# Patient Record
Sex: MALE | Race: BLACK OR AFRICAN AMERICAN | NOT HISPANIC OR LATINO | Employment: UNEMPLOYED | URBAN - METROPOLITAN AREA
[De-identification: names, ages, dates, MRNs, and addresses within clinical notes are randomized per-mention and may not be internally consistent; named-entity substitution may affect disease eponyms.]

---

## 2024-05-22 ENCOUNTER — HOSPITAL ENCOUNTER (EMERGENCY)
Facility: HOSPITAL | Age: 19
Discharge: HOME | End: 2024-05-22
Attending: EMERGENCY MEDICINE
Payer: COMMERCIAL

## 2024-05-22 ENCOUNTER — APPOINTMENT (OUTPATIENT)
Dept: CT IMAGING | Facility: HOSPITAL | Age: 19
End: 2024-05-22
Payer: COMMERCIAL

## 2024-05-22 VITALS
WEIGHT: 134 LBS | TEMPERATURE: 98.9 F | SYSTOLIC BLOOD PRESSURE: 126 MMHG | DIASTOLIC BLOOD PRESSURE: 83 MMHG | RESPIRATION RATE: 14 BRPM | BODY MASS INDEX: 19.18 KG/M2 | HEART RATE: 66 BPM | OXYGEN SATURATION: 97 % | HEIGHT: 70 IN

## 2024-05-22 DIAGNOSIS — G43.809 OTHER MIGRAINE WITHOUT STATUS MIGRAINOSUS, NOT INTRACTABLE: Primary | ICD-10-CM

## 2024-05-22 PROCEDURE — 99284 EMERGENCY DEPT VISIT MOD MDM: CPT

## 2024-05-22 PROCEDURE — 70450 CT HEAD/BRAIN W/O DYE: CPT

## 2024-05-22 NOTE — ED PROVIDER NOTES
EMERGENCY DEPARTMENT ENCOUNTER    Pt Name: Kelsea Locke  MRN: [id number]  Pt :   2005  Room Number:    Date of encounter:  2024  PCP: Provider, No Known  ED Provider: BANDAR Bell    Historian: Patient    HPI:  Chief Complaint: Headache    Context: Kelsea Locke is a 18 y.o. male who presents to the ED c/o headache.  Patient reports that he has been experiencing a severe stabbing headache behind his right eye.  He reports that he has been having this pain every day for approximately last 2-1/2 months.  He reports that initially when this pain started he was 15.  He shares that when he gets the pain he has to take medicine or go to sleep to help the pain to go away.  He shares that he usually takes ibuprofen that sometimes helps the pain.  Patient reports that he was scheduled to have an MRI completed in [place] but did not get this done before coming to [place].  He denies any fever.  Reports no nausea or vomiting.  He has had no dizziness or feeling lightheaded.  He denies any changes in his vision.  HPI     REVIEW OF SYSTEMS  A chief complaint appropriate review of systems was completed and is negative except as noted in the HPI.     PAST MEDICAL HISTORY  No past medical history on file.    PAST SURGICAL HISTORY  No past surgical history on file.    FAMILY HISTORY  No family history on file.    SOCIAL HISTORY  Social History     Socioeconomic History    Marital status: Single       ALLERGIES  Patient has no known allergies.    PHYSICAL EXAM  Physical Exam  GENERAL:   Appears in no acute distress.  HENT: Nares patent.  EYES: No scleral icterus.  CV: Regular rhythm, regular rate.  RESPIRATORY: Normal effort.  No audible wheezes, rales or rhonchi.  ABDOMEN: Soft, nontender  MUSCULOSKELETAL: No deformities.   NEURO: Alert, moves all extremities, follows commands.  No focal deficits.  SKIN: Warm, dry, no rash visualized.  I have reviewed the triage vital signs and nursing notes.     LAB  RESULTS  No results found for this or any previous visit.    If labs were ordered, I independently reviewed the results and considered them in treating the patient.    RADIOLOGY  CT Head Without Contrast   Final Result   Impression:   No acute intracranial abnormality.            Electronically Signed: Juan Alberto Rockwell MD     5/22/2024 9:07 PM EDT     Workstation ID: [id number]        [x] Radiologist's Report Reviewed:  I ordered and independently interpreted the above noted radiographic studies.  See radiologist's dictation for official interpretation.      PROCEDURES    Procedures    No orders to display       MEDICATIONS GIVEN IN ER    Medications - No data to display    MEDICAL DECISION MAKING, PROGRESS, and CONSULTS   Medical Decision Making  Problems Addressed:  Other migraine without status migrainosus, not intractable: complicated acute illness or injury    Amount and/or Complexity of Data Reviewed  Radiology: ordered.        All labs have been independently reviewed by me.  All radiology studies have been interpreted by me and the radiologist dictating the report.  All EKG's have been independently interpreted by me as well as and overseeing attending physician.    [] Discussed with radiology regarding test interpretation:    Discussion below represents my analysis of pertinent findings related to patient's condition, differential diagnosis, treatment plan and final disposition.    Differential diagnosis:  The differential diagnosis associated with the patient's presentation includes: Migraine, tension headache, sinusitis, viral syndrome, cluster headache or brain tumor.     Additional sources  Discussed/ obtained information from independent historians:   [] Spouse  [x] Parent  [] Family member  [] Friend  [] EMS   [] Other:  External (non-ED) record review:   [] Inpatient record:   [] Office record:   [] Outpatient record:   [] Prior Outpatient labs:   [] Prior Outpatient radiology:   [] Primary Care  record:   [] Outside ED record:   [] Other:   Patient's care impacted by:   [] Diabetes  [] Hypertension  [] Hyperlipidemia  [] Hypothyroidism   [] Coronary Artery Disease   [] COPD   [] Cancer   [] Obesity  [] GERD   [] Tobacco Abuse   [] Substance Abuse    [] Anxiety   [] Depression   [] Other:   Care significantly affected by Social Determinants of Health (housing and economic circumstances, unemployment)    [] Yes     [x] No   If yes, Patient's care significantly limited by  Social Determinants of Health including:   [] Inadequate housing   [] Low income   [] Alcoholism and drug addiction in family   [] Problems related to primary support group   [] Unemployment   [] Problems related to employment   [] Other Social Determinants of Health:     Shared decision making:  I had a discussion with the patient/family regarding diagnosis, diagnostic results, treatment plan, and medications.  The patient/family indicated understanding of these instructions.  I spent adequate time at the bedside preceding discharge necessary to personally discuss the aftercare instructions, giving patient education, providing explanations of the results of our evaluations/findings, and my decision making to assure that the patient/family understand the plan of care.  Time was allotted to answer questions at that time and throughout the ED course.  Emphasis was placed on timely follow-up after discharge.  I also discussed the potential for the development of an acute emergent condition requiring further evaluation, admission, or even surgical intervention. I discussed that we found nothing during the visit today indicating the need for further workup, admission, or the presence of an unstable medical condition.  I encouraged the patient to return to the emergency department immediately for ANY concerns, worsening, new complaints, or if symptoms persist and unable to seek follow-up in a timely fashion.  The patient/family expressed  understanding and agreement with this plan.  The patient will follow-up with primary care and neurology for reevaluation.      Orders placed during this visit:  Orders Placed This Encounter   Procedures    CT Head Without Contrast     ED Course:    ED Course as of 05/23/24 0248   Wed May 22, 2024   1955 Vitals and Telemetry tracing was reviewed and directly interpreted by myself demonstrating blood pressure 120/79, afebrile, heart rate of 68, respirations of 14 breaths/min and oxygen saturation 99% on room air [JG]   1956 BP: 120/79 [JG]   1956 Temp: 98.9 °F (37.2 °C) [JG]   1956 Heart Rate: 68 [JG]   1956 Resp: 14 [JG]   1956 SpO2: 99 % [JG]   2100 CT head personally interpreted by myself and with official read provided by radiology demonstrates  No acute intracranial abnormality. [JG]   2114 18-year-old nontoxic-appearing male presents ED for evaluation of reoccurring headache behind his right eye. Scheduled for imaging outpatient in [place] but has not followed up since moving to [place]. No acute or emergent findings demonstrated on physical exam. No focal neurologic deficits. CT head completed without evidence of acute findings. Based on clinical presentation and workup in ED no clear indication for treatment beyond symptomatic management. At time of discharge disposition patient is afebrile, nontoxic appearing, vital signs stable and able to maintain O2 sats of 97% on room air. Patient will be discharged home with symptomatic care and outpatient follow up. [JG]      ED Course User Index  [JG] Shamar Cordero PA          DIAGNOSIS  Final diagnoses:   Other migraine without status migrainosus, not intractable       DISPOSITION    ED Disposition       ED Disposition   Discharge    Condition   Stable    Comment   --               Please note that portions of this document were completed with voice recognition software.        Shamar Cordero PA  05/23/24 0248

## 2024-05-23 NOTE — DISCHARGE INSTRUCTIONS
Symptomatic care is recommended. Take all medications as prescribed and instructed. Follow up with primary care and Neurology as directed or return to Emergency Department with worsening of symptoms.

## 2024-05-24 ENCOUNTER — HOSPITAL ENCOUNTER (EMERGENCY)
Facility: HOSPITAL | Age: 19
Discharge: HOME | End: 2024-05-24
Attending: EMERGENCY MEDICINE
Payer: COMMERCIAL

## 2024-05-24 VITALS
WEIGHT: 135 LBS | HEIGHT: 70 IN | TEMPERATURE: 98 F | HEART RATE: 77 BPM | SYSTOLIC BLOOD PRESSURE: 143 MMHG | DIASTOLIC BLOOD PRESSURE: 93 MMHG | BODY MASS INDEX: 19.33 KG/M2 | RESPIRATION RATE: 18 BRPM | OXYGEN SATURATION: 99 %

## 2024-05-24 DIAGNOSIS — J30.2 SEASONAL ALLERGIES: ICD-10-CM

## 2024-05-24 DIAGNOSIS — G43.909 MIGRAINE WITHOUT STATUS MIGRAINOSUS, NOT INTRACTABLE, UNSPECIFIED MIGRAINE TYPE: Primary | ICD-10-CM

## 2024-05-24 LAB
ALBUMIN SERPL-MCNC: 4.7 G/DL (ref 3.5–5.2)
ALBUMIN/GLOB SERPL: 1.7 G/DL
ALP SERPL-CCNC: 86 U/L (ref 39–117)
ALT SERPL W P-5'-P-CCNC: 14 U/L (ref 1–41)
AMPHET+METHAMPHET UR QL: NEGATIVE
AMPHETAMINES UR QL: NEGATIVE
ANION GAP SERPL CALCULATED.3IONS-SCNC: 8 MMOL/L (ref 5–15)
AST SERPL-CCNC: 22 U/L (ref 1–40)
BARBITURATES UR QL SCN: NEGATIVE
BASOPHILS # BLD AUTO: 0.07 10*3/MM3 (ref 0–0.2)
BASOPHILS NFR BLD AUTO: 0.4 % (ref 0–1.5)
BENZODIAZ UR QL SCN: NEGATIVE
BILIRUB SERPL-MCNC: 0.4 MG/DL (ref 0–1.2)
BUN SERPL-MCNC: 13 MG/DL (ref 6–20)
BUN/CREAT SERPL: 14.1 (ref 7–25)
BUPRENORPHINE SERPL-MCNC: NEGATIVE NG/ML
CALCIUM SPEC-SCNC: 9.2 MG/DL (ref 8.6–10.5)
CANNABINOIDS SERPL QL: NEGATIVE
CHLORIDE SERPL-SCNC: 101 MMOL/L (ref 98–107)
CO2 SERPL-SCNC: 28 MMOL/L (ref 22–29)
COCAINE UR QL: NEGATIVE
CREAT SERPL-MCNC: 0.92 MG/DL (ref 0.76–1.27)
DEPRECATED RDW RBC AUTO: 41.9 FL (ref 37–54)
EGFRCR SERPLBLD CKD-EPI 2021: 122.1 ML/MIN/1.73
EOSINOPHIL # BLD AUTO: 0.06 10*3/MM3 (ref 0–0.4)
EOSINOPHIL NFR BLD AUTO: 0.3 % (ref 0.3–6.2)
ERYTHROCYTE [DISTWIDTH] IN BLOOD BY AUTOMATED COUNT: 12.3 % (ref 12.3–15.4)
ETHANOL BLD-MCNC: <10 MG/DL (ref 0–10)
FENTANYL UR-MCNC: NEGATIVE NG/ML
GLOBULIN UR ELPH-MCNC: 2.7 GM/DL
GLUCOSE SERPL-MCNC: 157 MG/DL (ref 65–99)
HCT VFR BLD AUTO: 42.5 % (ref 37.5–51)
HGB BLD-MCNC: 14.3 G/DL (ref 13–17.7)
IMM GRANULOCYTES # BLD AUTO: 0.07 10*3/MM3 (ref 0–0.05)
IMM GRANULOCYTES NFR BLD AUTO: 0.4 % (ref 0–0.5)
LYMPHOCYTES # BLD AUTO: 1.7 10*3/MM3 (ref 0.7–3.1)
LYMPHOCYTES NFR BLD AUTO: 9.8 % (ref 19.6–45.3)
MCH RBC QN AUTO: 31.1 PG (ref 26.6–33)
MCHC RBC AUTO-ENTMCNC: 33.6 G/DL (ref 31.5–35.7)
MCV RBC AUTO: 92.4 FL (ref 79–97)
METHADONE UR QL SCN: NEGATIVE
MONOCYTES # BLD AUTO: 0.86 10*3/MM3 (ref 0.1–0.9)
MONOCYTES NFR BLD AUTO: 4.9 % (ref 5–12)
NEUTROPHILS NFR BLD AUTO: 14.67 10*3/MM3 (ref 1.7–7)
NEUTROPHILS NFR BLD AUTO: 84.2 % (ref 42.7–76)
NRBC BLD AUTO-RTO: 0 /100 WBC (ref 0–0.2)
OPIATES UR QL: NEGATIVE
OXYCODONE UR QL SCN: NEGATIVE
PCP UR QL SCN: NEGATIVE
PLATELET # BLD AUTO: 315 10*3/MM3 (ref 140–450)
PMV BLD AUTO: 8.8 FL (ref 6–12)
POTASSIUM SERPL-SCNC: 4.5 MMOL/L (ref 3.5–5.2)
PROT SERPL-MCNC: 7.4 G/DL (ref 6–8.5)
RBC # BLD AUTO: 4.6 10*6/MM3 (ref 4.14–5.8)
RETICS # AUTO: 0.04 10*6/MM3 (ref 0.02–0.13)
RETICS/RBC NFR AUTO: 0.86 % (ref 0.7–1.9)
SODIUM SERPL-SCNC: 137 MMOL/L (ref 136–145)
TRICYCLICS UR QL SCN: NEGATIVE
WBC NRBC COR # BLD AUTO: 17.43 10*3/MM3 (ref 3.4–10.8)

## 2024-05-24 PROCEDURE — 82077 ASSAY SPEC XCP UR&BREATH IA: CPT | Performed by: EMERGENCY MEDICINE

## 2024-05-24 PROCEDURE — 25010000002 DIPHENHYDRAMINE PER 50 MG: Performed by: EMERGENCY MEDICINE

## 2024-05-24 PROCEDURE — 96374 THER/PROPH/DIAG INJ IV PUSH: CPT

## 2024-05-24 PROCEDURE — 85045 AUTOMATED RETICULOCYTE COUNT: CPT | Performed by: EMERGENCY MEDICINE

## 2024-05-24 PROCEDURE — 25010000002 METOCLOPRAMIDE PER 10 MG: Performed by: EMERGENCY MEDICINE

## 2024-05-24 PROCEDURE — 85660 RBC SICKLE CELL TEST: CPT | Performed by: EMERGENCY MEDICINE

## 2024-05-24 PROCEDURE — 99283 EMERGENCY DEPT VISIT LOW MDM: CPT

## 2024-05-24 PROCEDURE — 25810000003 SODIUM CHLORIDE 0.9 % SOLUTION: Performed by: EMERGENCY MEDICINE

## 2024-05-24 PROCEDURE — 25010000002 KETOROLAC TROMETHAMINE PER 15 MG: Performed by: EMERGENCY MEDICINE

## 2024-05-24 PROCEDURE — 96375 TX/PRO/DX INJ NEW DRUG ADDON: CPT

## 2024-05-24 PROCEDURE — 80307 DRUG TEST PRSMV CHEM ANLYZR: CPT | Performed by: EMERGENCY MEDICINE

## 2024-05-24 PROCEDURE — 85025 COMPLETE CBC W/AUTO DIFF WBC: CPT | Performed by: EMERGENCY MEDICINE

## 2024-05-24 PROCEDURE — 80053 COMPREHEN METABOLIC PANEL: CPT | Performed by: EMERGENCY MEDICINE

## 2024-05-24 RX ORDER — KETOROLAC TROMETHAMINE 15 MG/ML
15 INJECTION, SOLUTION INTRAMUSCULAR; INTRAVENOUS ONCE
Status: COMPLETED | OUTPATIENT
Start: 2024-05-24 | End: 2024-05-24

## 2024-05-24 RX ORDER — METOCLOPRAMIDE HYDROCHLORIDE 5 MG/ML
10 INJECTION INTRAMUSCULAR; INTRAVENOUS ONCE
Status: COMPLETED | OUTPATIENT
Start: 2024-05-24 | End: 2024-05-24

## 2024-05-24 RX ORDER — CETIRIZINE HYDROCHLORIDE 10 MG/1
10 TABLET ORAL DAILY
Qty: 7 TABLET | Refills: 0 | Status: SHIPPED | OUTPATIENT
Start: 2024-05-24 | End: 2024-05-31

## 2024-05-24 RX ORDER — DIPHENHYDRAMINE HYDROCHLORIDE 50 MG/ML
25 INJECTION INTRAMUSCULAR; INTRAVENOUS ONCE
Status: COMPLETED | OUTPATIENT
Start: 2024-05-24 | End: 2024-05-24

## 2024-05-24 RX ORDER — SODIUM CHLORIDE 0.9 % (FLUSH) 0.9 %
10 SYRINGE (ML) INJECTION AS NEEDED
Status: DISCONTINUED | OUTPATIENT
Start: 2024-05-24 | End: 2024-05-24 | Stop reason: HOSPADM

## 2024-05-24 RX ADMIN — KETOROLAC TROMETHAMINE 15 MG: 15 INJECTION, SOLUTION INTRAMUSCULAR; INTRAVENOUS at 19:59

## 2024-05-24 RX ADMIN — SODIUM CHLORIDE 1000 ML: 9 INJECTION, SOLUTION INTRAVENOUS at 19:58

## 2024-05-24 RX ADMIN — DIPHENHYDRAMINE HYDROCHLORIDE 25 MG: 50 INJECTION INTRAMUSCULAR; INTRAVENOUS at 19:59

## 2024-05-24 RX ADMIN — METOCLOPRAMIDE 10 MG: 5 INJECTION, SOLUTION INTRAMUSCULAR; INTRAVENOUS at 20:00

## 2024-05-24 NOTE — Clinical Note
[facility] EMERGENCY DEPARTMENT  [address]  [facility] [address]  Phone: [phone]    Kelsea Locke was seen and treated in our emergency department on 5/24/2024.  He may return to work on 05/27/2024.         Thank you for choosing [facility].    Andres Anne MD

## 2024-05-24 NOTE — ED PROVIDER NOTES
Subjective   History of Present Illness  18-year-old male who presents for evaluation of headache.  The patient actually presented to our facility 2 days ago for the same thing.  He describes the headache as being present in the temporal region.  He states that been present for the last 2 and half months.  He denies cancer or unexpected change in weight.  He does report some slight decreased appetite.  The pain is isolated to the head without radiation.  No neck pain or stiffness.  No fever or infectious symptoms.  No previous history of sickle cell disease.  He just moved here to [place] from [place].  He was evaluated to some degree by his primary care physician for chest pain and headache before in the past.  He states that last time he had a headache like this was when he was 15 years of age.  He denies any other changes in his habits besides moving.  He exhibits normal mentation with a normal intact neurologic exam.  CT scan of the head without contrast and labs performed 2 days ago were nonrevealing.  The patient was discharged with neurology follow-up.  Headache is redevelop which prompted the current repeat presentation to the ER.      Review of Systems   Constitutional:  Positive for appetite change. Negative for chills, fatigue and fever.   HENT:  Negative for congestion, ear pain, postnasal drip, sinus pressure and sore throat.    Eyes:  Negative for pain, redness and visual disturbance.   Respiratory:  Negative for cough, chest tightness and shortness of breath.    Cardiovascular:  Negative for chest pain, palpitations and leg swelling.   Gastrointestinal:  Negative for abdominal pain, anal bleeding, blood in stool, diarrhea, nausea and vomiting.   Endocrine: Negative for polydipsia and polyuria.   Genitourinary:  Negative for difficulty urinating, dysuria, frequency and urgency.   Musculoskeletal:  Negative for arthralgias, back pain and neck pain.   Skin:  Negative for pallor and rash.    Allergic/Immunologic: Negative for environmental allergies and immunocompromised state.   Neurological:  Positive for headaches. Negative for dizziness and weakness.   Hematological:  Negative for adenopathy.   Psychiatric/Behavioral:  Negative for confusion, self-injury and suicidal ideas. The patient is not nervous/anxious.    All other systems reviewed and are negative.      No past medical history on file.    No Known Allergies    No past surgical history on file.    No family history on file.    Social History     Socioeconomic History    Marital status: Single           Objective   Physical Exam  Vitals and nursing note reviewed.   Constitutional:       General: He is not in acute distress.     Appearance: Normal appearance. He is well-developed. He is not toxic-appearing or diaphoretic.   HENT:      Head: Normocephalic and atraumatic.      Right Ear: External ear normal.      Left Ear: External ear normal.      Nose: Nose normal.   Eyes:      General: Lids are normal.      Pupils: Pupils are equal, round, and reactive to light.   Neck:      Trachea: No tracheal deviation.   Cardiovascular:      Rate and Rhythm: Normal rate and regular rhythm.      Pulses: No decreased pulses.      Heart sounds: Normal heart sounds. No murmur heard.     No friction rub. No gallop.   Pulmonary:      Effort: Pulmonary effort is normal. No respiratory distress.      Breath sounds: Normal breath sounds. No decreased breath sounds, wheezing, rhonchi or rales.   Abdominal:      General: Bowel sounds are normal.      Palpations: Abdomen is soft.      Tenderness: There is no abdominal tenderness. There is no guarding or rebound.   Musculoskeletal:         General: No deformity. Normal range of motion.      Cervical back: Normal range of motion and neck supple.   Lymphadenopathy:      Cervical: No cervical adenopathy.   Skin:     General: Skin is warm and dry.      Findings: No rash.   Neurological:      Mental Status: He is alert  and oriented to person, place, and time.      GCS: GCS eye subscore is 4. GCS verbal subscore is 5. GCS motor subscore is 6.      Cranial Nerves: No cranial nerve deficit.      Sensory: No sensory deficit.      Comments: Normal neurologic exam.    GCS 15.   Psychiatric:         Speech: Speech normal.         Behavior: Behavior normal.         Thought Content: Thought content normal.         Judgment: Judgment normal.         Procedures           ED Course                                     MIGUEL reviewed by Andres Anne MD       Medical Decision Making  Differential diagnosis includes meningitis, viral illness, migraine headaches, seasonal allergies, dehydration, other unspecified etiology    Labs show leukocytosis.  The patient has normal kidney function electrolytes.  Alcohol level is negative.  Urine drug screen is negative.  The patient does not have any meningismus on exam and is afebrile.  Vital signs are otherwise normal.    The patient has a normal intact neurological exam.    I discussed the option of a lumbar puncture, but the patient defers at this given time.  Clinically he does not appear consistent with meningitis therefore I am agreeable with this course.    The patient was given fluids and a migraine headache cocktail and appears much better on repeat evaluation and strongly desires to go home.    He will be given Zyrtec for possible seasonal allergies, advised to follow-up with neurology for further outpatient evaluation.    He will also be referred to primary care physician for outpatient evaluation.    Problems Addressed:  Migraine without status migrainosus, not intractable, unspecified migraine type: complicated acute illness or injury with systemic symptoms  Seasonal allergies: complicated acute illness or injury with systemic symptoms    Amount and/or Complexity of Data Reviewed  External Data Reviewed: labs and radiology.  Labs: ordered. Decision-making details documented in ED  Course.    Risk  OTC drugs.  Prescription drug management.        Final diagnoses:   Migraine without status migrainosus, not intractable, unspecified migraine type   Seasonal allergies       ED Disposition  ED Disposition       ED Disposition   Discharge    Condition   Stable    Comment   --               [id number]  [address]  [facility] [id number] [phone]  Schedule an appointment as soon as possible for a visit       [facility] - [place] [address]  [phone]  Schedule an appointment as soon as possible for a visit            Medication List        New Prescriptions      cetirizine 10 MG tablet  Commonly known as: zyrTEC  Take 1 tablet by mouth Daily for 7 days.               Where to Get Your Medications        These medications were sent to [id number] - [place], [place] - [address] [facility] [address] [facility] - [phone] [facility] [phone]   [address], [facility] [address]      Phone: [phone]   cetirizine 10 MG tablet            Andres Anne MD  05/24/24 2534

## 2024-05-25 NOTE — DISCHARGE INSTRUCTIONS
Take Zyrtec as prescribed to help with potential seasonal allergies.    Take ibuprofen as needed to help with any further headache.    Make sure to rest and plenty of fluids.    Return to the ER with any further concern.

## 2024-05-28 LAB — HGB S BLD QL SOLY: NEGATIVE
